# Patient Record
Sex: MALE | Race: WHITE | NOT HISPANIC OR LATINO | URBAN - METROPOLITAN AREA
[De-identification: names, ages, dates, MRNs, and addresses within clinical notes are randomized per-mention and may not be internally consistent; named-entity substitution may affect disease eponyms.]

---

## 2017-03-02 ENCOUNTER — PREPPED CHART (OUTPATIENT)
Dept: URBAN - METROPOLITAN AREA CLINIC 39 | Facility: CLINIC | Age: 37
End: 2017-03-02

## 2018-03-08 ENCOUNTER — ESTABLISHED COMPREHENSIVE EXAM (OUTPATIENT)
Dept: URBAN - METROPOLITAN AREA CLINIC 39 | Facility: CLINIC | Age: 38
End: 2018-03-08

## 2018-03-08 DIAGNOSIS — H52.01: ICD-10-CM

## 2018-03-08 DIAGNOSIS — H52.203: ICD-10-CM

## 2018-03-08 DIAGNOSIS — H52.12: ICD-10-CM

## 2018-03-08 PROCEDURE — 92015 DETERMINE REFRACTIVE STATE: CPT

## 2018-03-08 PROCEDURE — 92014 COMPRE OPH EXAM EST PT 1/>: CPT

## 2018-03-08 ASSESSMENT — KERATOMETRY
OD_AXISANGLE2_DEGREES: 084
OD_K1POWER_DIOPTERS: 41.75
OD_AXISANGLE_DEGREES: 174
OD_K2POWER_DIOPTERS: 44.00
OS_AXISANGLE2_DEGREES: 096
OS_K1POWER_DIOPTERS: 43.00
OS_K2POWER_DIOPTERS: 43.25
OS_AXISANGLE_DEGREES: 006

## 2018-03-08 ASSESSMENT — TONOMETRY
OD_IOP_MMHG: 14
OS_IOP_MMHG: 14

## 2018-03-08 ASSESSMENT — VISUAL ACUITY
OD_SC: J6
OS_CC: 20/20
OD_SC: 20/80
OD_CC: 20/40+2
OS_CC: J1+
OD_CC: J3
OS_SC: J1
OS_SC: 20/60

## 2020-06-29 NOTE — PATIENT DISCUSSION
----- Message from Lorna Peace sent at 12/28/2017 12:58 PM CST -----  Contact: pt   Pt calling about medication that he should have gotten right after xmas,,and he has not gotten anything,,,Please call pt back at [phone]   Recommended warm compresses.

## 2020-06-29 NOTE — PATIENT DISCUSSION
No ocular cause found, no hollenhorst plaque or vessel changes. Letter to cardiologist and PCP, carotid study may be advisable.